# Patient Record
Sex: FEMALE | Race: BLACK OR AFRICAN AMERICAN | NOT HISPANIC OR LATINO | URBAN - METROPOLITAN AREA
[De-identification: names, ages, dates, MRNs, and addresses within clinical notes are randomized per-mention and may not be internally consistent; named-entity substitution may affect disease eponyms.]

---

## 2022-10-06 ENCOUNTER — EMERGENCY (EMERGENCY)
Facility: HOSPITAL | Age: 32
LOS: 1 days | Discharge: STILL ADMITTED OR ADMINISTRATIVE | End: 2022-10-06
Admitting: EMERGENCY MEDICINE

## 2022-10-06 ENCOUNTER — OUTPATIENT (OUTPATIENT)
Dept: INPATIENT UNIT | Facility: HOSPITAL | Age: 32
LOS: 1 days | Discharge: HOME | End: 2022-10-06

## 2022-10-06 VITALS — DIASTOLIC BLOOD PRESSURE: 65 MMHG | HEART RATE: 87 BPM | SYSTOLIC BLOOD PRESSURE: 114 MMHG

## 2022-10-06 VITALS
DIASTOLIC BLOOD PRESSURE: 75 MMHG | RESPIRATION RATE: 18 BRPM | TEMPERATURE: 98 F | HEART RATE: 103 BPM | OXYGEN SATURATION: 100 % | SYSTOLIC BLOOD PRESSURE: 135 MMHG

## 2022-10-06 VITALS
HEART RATE: 99 BPM | SYSTOLIC BLOOD PRESSURE: 129 MMHG | TEMPERATURE: 100 F | RESPIRATION RATE: 16 BRPM | DIASTOLIC BLOOD PRESSURE: 78 MMHG

## 2022-10-06 DIAGNOSIS — O26.899 OTHER SPECIFIED PREGNANCY RELATED CONDITIONS, UNSPECIFIED TRIMESTER: ICD-10-CM

## 2022-10-06 DIAGNOSIS — Z3A.00 WEEKS OF GESTATION OF PREGNANCY NOT SPECIFIED: ICD-10-CM

## 2022-10-06 DIAGNOSIS — Z98.890 OTHER SPECIFIED POSTPROCEDURAL STATES: Chronic | ICD-10-CM

## 2022-10-06 LAB
ALBUMIN SERPL ELPH-MCNC: 3.7 G/DL — SIGNIFICANT CHANGE UP (ref 3.3–5)
ALP SERPL-CCNC: 55 U/L — SIGNIFICANT CHANGE UP (ref 40–120)
ALT FLD-CCNC: 24 U/L — SIGNIFICANT CHANGE UP (ref 4–33)
ANION GAP SERPL CALC-SCNC: 11 MMOL/L — SIGNIFICANT CHANGE UP (ref 7–14)
APPEARANCE UR: CLEAR — SIGNIFICANT CHANGE UP
APTT BLD: 34.4 SEC — SIGNIFICANT CHANGE UP (ref 27–36.3)
AST SERPL-CCNC: 30 U/L — SIGNIFICANT CHANGE UP (ref 4–32)
BASOPHILS # BLD AUTO: 0.02 K/UL — SIGNIFICANT CHANGE UP (ref 0–0.2)
BASOPHILS NFR BLD AUTO: 0.2 % — SIGNIFICANT CHANGE UP (ref 0–2)
BILIRUB SERPL-MCNC: 0.2 MG/DL — SIGNIFICANT CHANGE UP (ref 0.2–1.2)
BILIRUB UR-MCNC: NEGATIVE — SIGNIFICANT CHANGE UP
BUN SERPL-MCNC: 5 MG/DL — LOW (ref 7–23)
CALCIUM SERPL-MCNC: 9.5 MG/DL — SIGNIFICANT CHANGE UP (ref 8.4–10.5)
CHLORIDE SERPL-SCNC: 104 MMOL/L — SIGNIFICANT CHANGE UP (ref 98–107)
CO2 SERPL-SCNC: 21 MMOL/L — LOW (ref 22–31)
COLOR SPEC: YELLOW — SIGNIFICANT CHANGE UP
CREAT ?TM UR-MCNC: 152 MG/DL — SIGNIFICANT CHANGE UP
CREAT SERPL-MCNC: 0.58 MG/DL — SIGNIFICANT CHANGE UP (ref 0.5–1.3)
DIFF PNL FLD: NEGATIVE — SIGNIFICANT CHANGE UP
EGFR: 122 ML/MIN/1.73M2 — SIGNIFICANT CHANGE UP
EOSINOPHIL # BLD AUTO: 0.11 K/UL — SIGNIFICANT CHANGE UP (ref 0–0.5)
EOSINOPHIL NFR BLD AUTO: 0.9 % — SIGNIFICANT CHANGE UP (ref 0–6)
FIBRINOGEN PPP-MCNC: 711 MG/DL — HIGH (ref 330–520)
GLUCOSE SERPL-MCNC: 122 MG/DL — HIGH (ref 70–99)
GLUCOSE UR QL: NEGATIVE — SIGNIFICANT CHANGE UP
HCT VFR BLD CALC: 37.4 % — SIGNIFICANT CHANGE UP (ref 34.5–45)
HGB BLD-MCNC: 12.4 G/DL — SIGNIFICANT CHANGE UP (ref 11.5–15.5)
IANC: 9.4 K/UL — HIGH (ref 1.8–7.4)
IMM GRANULOCYTES NFR BLD AUTO: 0.8 % — SIGNIFICANT CHANGE UP (ref 0–0.9)
INR BLD: 1.06 RATIO — SIGNIFICANT CHANGE UP (ref 0.88–1.16)
KETONES UR-MCNC: ABNORMAL
LDH SERPL L TO P-CCNC: 183 U/L — SIGNIFICANT CHANGE UP (ref 135–225)
LEUKOCYTE ESTERASE UR-ACNC: NEGATIVE — SIGNIFICANT CHANGE UP
LYMPHOCYTES # BLD AUTO: 1.97 K/UL — SIGNIFICANT CHANGE UP (ref 1–3.3)
LYMPHOCYTES # BLD AUTO: 15.9 % — SIGNIFICANT CHANGE UP (ref 13–44)
MCHC RBC-ENTMCNC: 25.3 PG — LOW (ref 27–34)
MCHC RBC-ENTMCNC: 33.2 GM/DL — SIGNIFICANT CHANGE UP (ref 32–36)
MCV RBC AUTO: 76.2 FL — LOW (ref 80–100)
MONOCYTES # BLD AUTO: 0.78 K/UL — SIGNIFICANT CHANGE UP (ref 0–0.9)
MONOCYTES NFR BLD AUTO: 6.3 % — SIGNIFICANT CHANGE UP (ref 2–14)
NEUTROPHILS # BLD AUTO: 9.4 K/UL — HIGH (ref 1.8–7.4)
NEUTROPHILS NFR BLD AUTO: 75.9 % — SIGNIFICANT CHANGE UP (ref 43–77)
NITRITE UR-MCNC: NEGATIVE — SIGNIFICANT CHANGE UP
NRBC # BLD: 0 /100 WBCS — SIGNIFICANT CHANGE UP (ref 0–0)
NRBC # FLD: 0 K/UL — SIGNIFICANT CHANGE UP (ref 0–0)
PH UR: 6 — SIGNIFICANT CHANGE UP (ref 5–8)
PLATELET # BLD AUTO: 241 K/UL — SIGNIFICANT CHANGE UP (ref 150–400)
POTASSIUM SERPL-MCNC: 3.5 MMOL/L — SIGNIFICANT CHANGE UP (ref 3.5–5.3)
POTASSIUM SERPL-SCNC: 3.5 MMOL/L — SIGNIFICANT CHANGE UP (ref 3.5–5.3)
PROT ?TM UR-MCNC: 21 MG/DL — SIGNIFICANT CHANGE UP
PROT ?TM UR-MCNC: 21 MG/DL — SIGNIFICANT CHANGE UP
PROT SERPL-MCNC: 6.9 G/DL — SIGNIFICANT CHANGE UP (ref 6–8.3)
PROT UR-MCNC: ABNORMAL
PROT/CREAT UR-RTO: 0.1 RATIO — SIGNIFICANT CHANGE UP (ref 0–0.2)
PROTHROM AB SERPL-ACNC: 12.3 SEC — SIGNIFICANT CHANGE UP (ref 10.5–13.4)
RBC # BLD: 4.91 M/UL — SIGNIFICANT CHANGE UP (ref 3.8–5.2)
RBC # FLD: 17.1 % — HIGH (ref 10.3–14.5)
SODIUM SERPL-SCNC: 136 MMOL/L — SIGNIFICANT CHANGE UP (ref 135–145)
SP GR SPEC: 1.02 — SIGNIFICANT CHANGE UP (ref 1.01–1.05)
URATE SERPL-MCNC: 4.9 MG/DL — SIGNIFICANT CHANGE UP (ref 2.5–7)
UROBILINOGEN FLD QL: SIGNIFICANT CHANGE UP
WBC # BLD: 12.38 K/UL — HIGH (ref 3.8–10.5)
WBC # FLD AUTO: 12.38 K/UL — HIGH (ref 3.8–10.5)

## 2022-10-06 PROCEDURE — 99213 OFFICE O/P EST LOW 20 MIN: CPT | Mod: 25

## 2022-10-06 PROCEDURE — L9996: CPT

## 2022-10-06 PROCEDURE — 76815 OB US LIMITED FETUS(S): CPT | Mod: 26

## 2022-10-06 PROCEDURE — 93010 ELECTROCARDIOGRAM REPORT: CPT

## 2022-10-06 RX ORDER — LABETALOL HCL 100 MG
200 TABLET ORAL ONCE
Refills: 0 | Status: COMPLETED | OUTPATIENT
Start: 2022-10-06 | End: 2022-10-06

## 2022-10-06 RX ADMIN — Medication 200 MILLIGRAM(S): at 14:24

## 2022-10-06 NOTE — OB PROVIDER TRIAGE NOTE - NSOBPROVIDERNOTE_OBGYN_ALL_OB_FT
[facility]  Labetalol 200mg given for pt's scheduled dose @ 14:00 HELLP labs  Labetalol 200mg given for pt's scheduled dose @ 14:00  EKG performed HELLP labs  Labetalol 200mg given for pt's scheduled dose @ 14:00  EKG performed    no evidence of PEC  BP's normotensive here in triage  pt asymptomatic  d/w MD Castañeda 3rd year OB resident and MD Greco OB service attending  pt to be discharged home with  OB labor instructions  pt to continue monitoring BP's at home - BP parameters given. Instructed to bring BP cuff and BP log with her to next OB visit  pt to return to nearest ER or L&D triage for HA that does not go away w/ Tylenol, nausea or vomiting, blurry vision, epigastric pain, or BP >160/110  Continue taking Labetalol 200mg TID

## 2022-10-06 NOTE — OB PROVIDER TRIAGE NOTE - HISTORY OF PRESENT ILLNESS
33 y/o  at 22 weeks gestation c/o elevated BP's at home of 150/90, repeat 133/101, and dizziness upon standing at home. Pt states occasional HA, relieved by Tylenol. Elevated BP's began in July, 24hr urine mid August revealed 300 protein. Saw MD Jimmie Rossi in [place] on , where carotid US, echo, holter monitor, and EKG performed - all reported WNL. Pt asymptomatic - denies current HA, dizziness, blurry vision, n/v/d, chest pain, SOB, epigastric pain, ctx, cramping, lof, vb.    PNC at [facility], however also seeing Minerva Becker in [place] from [facility] for a "second opinion"    AP course c/b cHTN, resolved subchorionic hematoma  NKDA  Current medications:  -labetalol 200mg TID  -ASA  -PNV  -Tylenol PRN  OBGYN history:  -uterine fibroids  -h/o abnl pap smear, HPV  Denies medical history  Surgical history:  -myomectomy 2019  Denies psych history  Denies smoking, alcohol, and illicit drug use

## 2022-10-06 NOTE — OB PROVIDER TRIAGE NOTE - NSHPPHYSICALEXAM_GEN_ALL_CORE
A&O x3, in no acute distress  lungs: clear bilaterally  heart: regular rate and rhythm  +2 DTR's  no edema present bilaterally  TAS:    Vital Signs Last 24 Hrs  T(C): 37.5 (06 Oct 2022 13:26), Max: 37.5 (06 Oct 2022 13:07)  T(F): 99.5 (06 Oct 2022 13:26), Max: 99.5 (06 Oct 2022 13:07)  HR: 90 (06 Oct 2022 14:16) (90 - 103)  BP: 115/71 (06 Oct 2022 14:16) (115/71 - 135/75)  BP(mean): --  RR: 16 (06 Oct 2022 13:07) (16 - 18)  SpO2: 100% (06 Oct 2022 12:26) (100% - 100%)

## 2022-10-06 NOTE — OB RN TRIAGE NOTE - NS_GESTAGE_OBGYN_ALL_OB_FT
22w Drysol Counseling:  I discussed with the patient the risks of drysol/aluminum chloride including but not limited to skin rash, itching, irritation, burning.

## 2022-10-06 NOTE — ED ADULT TRIAGE NOTE - NS ED NURSE NOTE DISPO AOU
Call to [address] [facility] and advise per NP that patient will need loading dose of herceptin.
Last Herceptin infusion was 9/2015, so patient will need loading dose again as ordered. Verified with pharmacist, Jordan Holden.
Pharmacy called to clarify start date of herceptin. RN advised patient elected to start 1/9/17 post radiation. Pharmacist requested clarification if patient should receive herceptin loading dose. Advised would forward to provider for clarification and communicate. Fax:  [phone].
AOU-L&D

## 2022-10-06 NOTE — OB RN TRIAGE NOTE - FALL HARM RISK - UNIVERSAL INTERVENTIONS
Bed in lowest position, wheels locked, appropriate side rails in place/Instruct patient to call for assistance before getting out of bed or chair/Non-slip footwear when patient is out of bed/[place] to call system/Physically safe environment - no spills, clutter or unnecessary equipment/Purposeful Proactive Rounding/Room/bathroom lighting operational, light cord in reach

## 2022-10-06 NOTE — ED ADULT TRIAGE NOTE - CHIEF COMPLAINT QUOTE
Pt presents to ED ambulatory from home with c/o HTN. Pt reports she is 22weeks pregnant, G1, OBGYN Dr. Cook and Dr. Light, COLIN 02/09/2023. Pt reports blood pressure at home as high as 150/91 and 133/101. Pt takes Labetalol 200mg TID. L&D Ree advised pt to be evaluated with L&D.

## 2022-10-06 NOTE — OB PROVIDER TRIAGE NOTE - PLAN OF CARE
HELLP labs  Labetalol 200mg given for pt's scheduled dose @ 14:00  EKG performed    no evidence of PEC  BP's normotensive here in triage  pt asymptomatic  d/w MD Castañeda 3rd year OB resident and MD Greco OB service attending  pt to be discharged home with  OB labor instructions  pt to continue monitoring BP's at home - BP parameters given. Instructed to bring BP cuff and BP log with her to next OB visit  pt to return to nearest ER or L&D triage for HA that does not go away w/ Tylenol, nausea or vomiting, blurry vision, epigastric pain, or BP >160/110  Continue taking Labetalol 200mg TID

## 2022-10-12 ENCOUNTER — OUTPATIENT (OUTPATIENT)
Dept: INPATIENT UNIT | Facility: HOSPITAL | Age: 32
LOS: 1 days | Discharge: HOME | End: 2022-10-12

## 2022-10-12 ENCOUNTER — EMERGENCY (EMERGENCY)
Facility: HOSPITAL | Age: 32
LOS: 1 days | Discharge: STILL ADMITTED OR ADMINISTRATIVE | End: 2022-10-12
Admitting: EMERGENCY MEDICINE

## 2022-10-12 VITALS
OXYGEN SATURATION: 100 % | TEMPERATURE: 98 F | SYSTOLIC BLOOD PRESSURE: 143 MMHG | DIASTOLIC BLOOD PRESSURE: 87 MMHG | HEART RATE: 95 BPM | RESPIRATION RATE: 18 BRPM

## 2022-10-12 VITALS
SYSTOLIC BLOOD PRESSURE: 127 MMHG | DIASTOLIC BLOOD PRESSURE: 76 MMHG | TEMPERATURE: 98 F | HEART RATE: 92 BPM | RESPIRATION RATE: 17 BRPM

## 2022-10-12 DIAGNOSIS — O26.899 OTHER SPECIFIED PREGNANCY RELATED CONDITIONS, UNSPECIFIED TRIMESTER: ICD-10-CM

## 2022-10-12 DIAGNOSIS — Z98.890 OTHER SPECIFIED POSTPROCEDURAL STATES: Chronic | ICD-10-CM

## 2022-10-12 DIAGNOSIS — Z3A.00 WEEKS OF GESTATION OF PREGNANCY NOT SPECIFIED: ICD-10-CM

## 2022-10-12 PROCEDURE — L9996: CPT

## 2022-10-12 NOTE — OB RN TRIAGE NOTE - CHIEF COMPLAINT QUOTE
"Spotting and high blood pressures, 152/94 " "Spotting and high blood pressures, 152/94, I had my anatomy scan and vaginal sono done on 10/10"

## 2022-10-12 NOTE — ED ADULT TRIAGE NOTE - NS ED TRIAGE AVPU SCALE
Alert-The patient is alert, awake and responds to voice. The patient is oriented to time, place, and person. The triage nurse is able to obtain subjective information.
not examined

## 2022-10-12 NOTE — OB RN TRIAGE NOTE - NS_OBGYNHISTORY_OBGYN_ALL_OB_FT
myomectomy 2019 myomectomy 2019 for one large and few small fibroids  Fibroids X2 small in pregnancy  Abnormal pap smear 09/2022

## 2022-10-12 NOTE — OB RN TRIAGE NOTE - FALL HARM RISK - UNIVERSAL INTERVENTIONS
Bed in lowest position, wheels locked, appropriate side rails in place/Call bell, personal items and telephone in reach/Instruct patient to call for assistance before getting out of bed or chair/Non-slip footwear when patient is out of bed/[place] to call system/Physically safe environment - no spills, clutter or unnecessary equipment/Purposeful Proactive Rounding/Room/bathroom lighting operational, light cord in reach

## 2022-10-12 NOTE — OB RN TRIAGE NOTE - NSICDXPASTMEDICALHX_GEN_ALL_CORE_FT
PAST MEDICAL HISTORY:  Chronic hypertension     No pertinent past medical history      PAST MEDICAL HISTORY:  Chronic hypertension Diagnosedin first trimster of pregnancy    History of abnormal Pap smear

## 2022-10-12 NOTE — ED ADULT TRIAGE NOTE - CHIEF COMPLAINT QUOTE
Pt states she is approximately 5 months pregnant, c/o vaginal bleeding started this evening. Denies abdominal pain. COLIN 2/9/23. L&D notified.

## 2022-10-12 NOTE — OB RN TRIAGE NOTE - CURRENT PREGNANCY COMPLICATIONS, OB PROFILE
headaches/Hypertensive Disorder headaches, S/P Rx for UTI in 09/22, CHTN/Maternal Medical Condition/Hypertensive Disorder/Other

## 2022-10-13 VITALS — SYSTOLIC BLOOD PRESSURE: 132 MMHG | HEART RATE: 90 BPM | DIASTOLIC BLOOD PRESSURE: 78 MMHG

## 2022-10-13 PROBLEM — Z87.898 PERSONAL HISTORY OF OTHER SPECIFIED CONDITIONS: Chronic | Status: ACTIVE | Noted: 2022-10-06

## 2022-10-13 LAB
APPEARANCE UR: CLEAR — SIGNIFICANT CHANGE UP
APTT BLD: 34.2 SEC — SIGNIFICANT CHANGE UP (ref 27–36.3)
BACTERIA # UR AUTO: ABNORMAL
BASOPHILS # BLD AUTO: 0.03 K/UL — SIGNIFICANT CHANGE UP (ref 0–0.2)
BASOPHILS NFR BLD AUTO: 0.2 % — SIGNIFICANT CHANGE UP (ref 0–2)
BILIRUB UR-MCNC: NEGATIVE — SIGNIFICANT CHANGE UP
BLD GP AB SCN SERPL QL: NEGATIVE — SIGNIFICANT CHANGE UP
COD CRY URNS QL: ABNORMAL
COLOR SPEC: SIGNIFICANT CHANGE UP
DIFF PNL FLD: ABNORMAL
EOSINOPHIL # BLD AUTO: 0.13 K/UL — SIGNIFICANT CHANGE UP (ref 0–0.5)
EOSINOPHIL NFR BLD AUTO: 1.1 % — SIGNIFICANT CHANGE UP (ref 0–6)
EPI CELLS # UR: 2 /HPF — SIGNIFICANT CHANGE UP (ref 0–5)
FIBRINOGEN PPP-MCNC: 672 MG/DL — HIGH (ref 330–520)
GLUCOSE UR QL: NEGATIVE — SIGNIFICANT CHANGE UP
HCT VFR BLD CALC: 37.2 % — SIGNIFICANT CHANGE UP (ref 34.5–45)
HGB BLD-MCNC: 12.3 G/DL — SIGNIFICANT CHANGE UP (ref 11.5–15.5)
HYALINE CASTS # UR AUTO: 1 /LPF — SIGNIFICANT CHANGE UP (ref 0–7)
IANC: 8.89 K/UL — HIGH (ref 1.8–7.4)
IMM GRANULOCYTES NFR BLD AUTO: 0.5 % — SIGNIFICANT CHANGE UP (ref 0–0.9)
INR BLD: 1.04 RATIO — SIGNIFICANT CHANGE UP (ref 0.88–1.16)
KETONES UR-MCNC: NEGATIVE — SIGNIFICANT CHANGE UP
LEUKOCYTE ESTERASE UR-ACNC: NEGATIVE — SIGNIFICANT CHANGE UP
LYMPHOCYTES # BLD AUTO: 17.3 % — SIGNIFICANT CHANGE UP (ref 13–44)
LYMPHOCYTES # BLD AUTO: 2.11 K/UL — SIGNIFICANT CHANGE UP (ref 1–3.3)
MCHC RBC-ENTMCNC: 24.9 PG — LOW (ref 27–34)
MCHC RBC-ENTMCNC: 33.1 GM/DL — SIGNIFICANT CHANGE UP (ref 32–36)
MCV RBC AUTO: 75.5 FL — LOW (ref 80–100)
MONOCYTES # BLD AUTO: 1.01 K/UL — HIGH (ref 0–0.9)
MONOCYTES NFR BLD AUTO: 8.3 % — SIGNIFICANT CHANGE UP (ref 2–14)
NEUTROPHILS # BLD AUTO: 8.89 K/UL — HIGH (ref 1.8–7.4)
NEUTROPHILS NFR BLD AUTO: 72.6 % — SIGNIFICANT CHANGE UP (ref 43–77)
NITRITE UR-MCNC: NEGATIVE — SIGNIFICANT CHANGE UP
NRBC # BLD: 0 /100 WBCS — SIGNIFICANT CHANGE UP (ref 0–0)
NRBC # FLD: 0 K/UL — SIGNIFICANT CHANGE UP (ref 0–0)
PH UR: 6.5 — SIGNIFICANT CHANGE UP (ref 5–8)
PLATELET # BLD AUTO: 233 K/UL — SIGNIFICANT CHANGE UP (ref 150–400)
PROT UR-MCNC: ABNORMAL
PROTHROM AB SERPL-ACNC: 12.1 SEC — SIGNIFICANT CHANGE UP (ref 10.5–13.4)
RBC # BLD: 4.93 M/UL — SIGNIFICANT CHANGE UP (ref 3.8–5.2)
RBC # FLD: 16.4 % — HIGH (ref 10.3–14.5)
RBC CASTS # UR COMP ASSIST: 8 /HPF — HIGH (ref 0–4)
RH IG SCN BLD-IMP: POSITIVE — SIGNIFICANT CHANGE UP
SP GR SPEC: 1.01 — SIGNIFICANT CHANGE UP (ref 1.01–1.05)
UROBILINOGEN FLD QL: SIGNIFICANT CHANGE UP
WBC # BLD: 12.23 K/UL — HIGH (ref 3.8–10.5)
WBC # FLD AUTO: 12.23 K/UL — HIGH (ref 3.8–10.5)
WBC UR QL: 6 /HPF — HIGH (ref 0–5)

## 2022-10-13 PROCEDURE — 76830 TRANSVAGINAL US NON-OB: CPT | Mod: 26

## 2022-10-13 PROCEDURE — 99213 OFFICE O/P EST LOW 20 MIN: CPT | Mod: 25

## 2022-10-13 PROCEDURE — 76819 FETAL BIOPHYS PROFIL W/O NST: CPT | Mod: 26

## 2022-10-13 RX ORDER — ACETAMINOPHEN 500 MG
2 TABLET ORAL
Qty: 0 | Refills: 0 | DISCHARGE

## 2022-10-13 RX ORDER — PROPRANOLOL HCL 160 MG
0 CAPSULE, EXTENDED RELEASE 24HR ORAL
Qty: 0 | Refills: 0 | DISCHARGE

## 2022-10-13 RX ORDER — LABETALOL HCL 100 MG
0 TABLET ORAL
Qty: 0 | Refills: 0 | DISCHARGE

## 2022-10-13 RX ORDER — ASPIRIN/CALCIUM CARB/MAGNESIUM 324 MG
1 TABLET ORAL
Qty: 0 | Refills: 0 | DISCHARGE

## 2022-10-13 NOTE — OB PROVIDER TRIAGE NOTE - NSICDXPASTMEDICALHX_GEN_ALL_CORE_FT
PAST MEDICAL HISTORY:  Chronic hypertension Diagnosedin first trimster of pregnancy    History of abnormal Pap smear

## 2022-10-13 NOTE — OB PROVIDER TRIAGE NOTE - NSOBPROVIDERNOTE_OBGYN_ALL_OB_FT
32 yo , EGA@38 5/7 weeks vaginal spotting. 30 yo , EGA@38 5/7 weeks vaginal spotting.  no S/S of threaten  at this time   0440 plan discuss with DR. Humphrey PGY-3 and Dr Cara matute for discharge  Pt to keep self very well hydrated.   Discharge patient home.  Pre-term Labor precautions if not in labor, will follow up with OB for the next schedule appointment.  Prior notes, lab results (prenatal chart review, prenatal labs) and recommendations were reviewed;  All ordered tests results reviewed and interpreted.  Plan of care was reviewed with patient and family; patient states understanding of the above plan.  In total 35 minutes spent with established/new patient.

## 2022-10-13 NOTE — OB PROVIDER TRIAGE NOTE - NSHPPHYSICALEXAM_GEN_ALL_CORE
Vital Signs Last 24 Hrs  T(C): 36.8 (12 Oct 2022 23:04), Max: 36.9 (12 Oct 2022 22:39)  T(F): 98.2 (12 Oct 2022 23:04), Max: 98.4 (12 Oct 2022 22:39)  HR: 86 (13 Oct 2022 02:00) (75 - 95)  BP: 114/55 (13 Oct 2022 02:00) (114/55 - 143/87)  RR: 17 (12 Oct 2022 23:04) (17 - 18)  SpO2: 100% (12 Oct 2022 22:39) (100% - 100%)    Gen: NAD  Head: NC/AT  Cardio: S1S2+, RRR  Resp: CTABL, no wheezing  Abdomen: Soft, NT/ND, BS+  Extremities: No LE edema bilaterally      [place]: no Contractions noted    plan  UA  CBC, Fibrinogen  type and screen Vital Signs Last 24 Hrs  T(C): 36.8 (12 Oct 2022 23:04), Max: 36.9 (12 Oct 2022 22:39)  T(F): 98.2 (12 Oct 2022 23:04), Max: 98.4 (12 Oct 2022 22:39)  HR: 86 (13 Oct 2022 02:00) (75 - 95)  BP: 114/55 (13 Oct 2022 02:00) (114/55 - 143/87)  RR: 17 (12 Oct 2022 23:04) (17 - 18)  SpO2: 100% (12 Oct 2022 22:39) (100% - 100%)    Gen: NAD  Head: NC/AT  Cardio: S1S2+, RRR  Resp: CTABL, no wheezing  Abdomen: Soft, NT/ND, BS+  Extremities: No LE edema bilaterally      [place]: no Contractions noted    plan  UA  CBC, Fibrinogen, Pt, PTT INR  type and screen Vital Signs Last 24 Hrs  T(C): 36.8 (12 Oct 2022 23:04), Max: 36.9 (12 Oct 2022 22:39)  T(F): 98.2 (12 Oct 2022 23:04), Max: 98.4 (12 Oct 2022 22:39)  HR: 86 (13 Oct 2022 02:00) (75 - 95)  BP: 114/55 (13 Oct 2022 02:00) (114/55 - 143/87)  RR: 17 (12 Oct 2022 23:04) (17 - 18)  SpO2: 100% (12 Oct 2022 22:39) (100% - 100%)    Gen: NAD  Head: NC/AT  Cardio: S1S2+, RRR  Resp: CTABL, no wheezing  Abdomen: Soft, NT/ND, BS+  Extremities: No LE edema bilaterally    [place]: no Contractions noted  TAUS: cephalic presentation, posterior placenta, MVP 7.33  BPM  SSE: scant amount of leukorrhea, cervix appear close. no bleeding noted, FFN collected and held  TVUS: cervical length 4.14-4.22, no dynamical changes noted.    plan  UA  CBC, Fibrinogen, Pt, PTT INR  type and screen

## 2022-10-13 NOTE — OB PROVIDER TRIAGE NOTE - NS_OBGYNHISTORY_OBGYN_ALL_OB_FT
myomectomy 2019 for one large and few small fibroids  Fibroids X2 small in pregnancy  Abnormal pap smear 09/2022

## 2022-10-13 NOTE — OB PROVIDER TRIAGE NOTE - NSHPLABSRESULTS_GEN_ALL_CORE
CBC Full  -  ( 13 Oct 2022 02:30 )  WBC Count : 12.23 K/uL  RBC Count : 4.93 M/uL  Hemoglobin : 12.3 g/dL  Hematocrit : 37.2 %  Platelet Count - Automated : 233 K/uL  Mean Cell Volume : 75.5 fL  Mean Cell Hemoglobin : 24.9 pg  Mean Cell Hemoglobin Concentration : 33.1 gm/dL  Auto Neutrophil # : 8.89 K/uL  Auto Lymphocyte # : 2.11 K/uL  Auto Monocyte # : 1.01 K/uL  Auto Eosinophil # : 0.13 K/uL  Auto Basophil # : 0.03 K/uL  Auto Neutrophil % : 72.6 %  Auto Lymphocyte % : 17.3 %  Auto Monocyte % : 8.3 %  Auto Eosinophil % : 1.1 %  Auto Basophil % : 0.2 %    PT/INR - ( 13 Oct 2022 02:30 )   PT: 12.1 sec;   INR: 1.04 ratio         PTT - ( 13 Oct 2022 02:30 )  PTT:34.2 sec  Fibrinogen 672  Urinalysis Basic - ( 13 Oct 2022 02:30 )    Color: Light Yellow / Appearance: Clear / S.015 / pH: x  Gluc: x / Ketone: Negative  / Bili: Negative / Urobili: <2 mg/dL   Blood: x / Protein: Trace / Nitrite: Negative   Leuk Esterase: Negative / RBC: 8 /HPF / WBC 6 /HPF   Sq Epi: x / Non Sq Epi: 2 /HPF / Bacteria: Moderate CBC Full  -  ( 13 Oct 2022 02:30 )  WBC Count : 12.23 K/uL  RBC Count : 4.93 M/uL  Hemoglobin : 12.3 g/dL  Hematocrit : 37.2 %  Platelet Count - Automated : 233 K/uL  Mean Cell Volume : 75.5 fL  Mean Cell Hemoglobin : 24.9 pg  Mean Cell Hemoglobin Concentration : 33.1 gm/dL  Auto Neutrophil # : 8.89 K/uL  Auto Lymphocyte # : 2.11 K/uL  Auto Monocyte # : 1.01 K/uL  Auto Eosinophil # : 0.13 K/uL  Auto Basophil # : 0.03 K/uL  Auto Neutrophil % : 72.6 %  Auto Lymphocyte % : 17.3 %  Auto Monocyte % : 8.3 %  Auto Eosinophil % : 1.1 %  Auto Basophil % : 0.2 %    PT/INR - ( 13 Oct 2022 02:30 )   PT: 12.1 sec;   INR: 1.04 ratio         PTT - ( 13 Oct 2022 02:30 )  PTT:34.2 sec  Fibrinogen 672  Urinalysis Basic - ( 13 Oct 2022 02:30 )    Color: Light Yellow / Appearance: Clear / S.015 / pH: x  Gluc: x / Ketone: Negative  / Bili: Negative / Urobili: <2 mg/dL   Blood: x / Protein: Trace / Nitrite: Negative   Leuk Esterase: Negative / RBC: 8 /HPF / WBC 6 /HPF   Sq Epi: x / Non Sq Epi: 2 /HPF / Bacteria: Moderate  blood type O positive

## 2022-12-06 NOTE — OB PROVIDER TRIAGE NOTE - HISTORY OF PRESENT ILLNESS
33 y/o  at 22 6/7 weeks gestation c/o vaginal spotting 0930 when wipping,  denies vaginal leakage of fluid, report positive fetal movement.  denies constipation or urinary issues.   Pt asymptomatic - denies current HA, dizziness, blurry vision, n/v/d, chest pain, SOB, epigastric pain,    PNC at [facility], however also seeing Minerva Becker in [place] from [facility] for a "second opinion"    AP course c/b cHTN, Elevated BP's began in July, 24hr urine mid August revealed 300 protein. Saw MD Jimmie Rossi in [place] on , where carotid US, echo, holter monitor, and EKG performed -   resolved subchorionic hematoma  NKDA  Current medications:  -labetalol 200mg TID last dose 10 PM  -ASA 1 tab daily  -PNV  -Tylenol PRN for HA.   OBGYN history:  -uterine fibroids  -h/o abnl pap smear 2022, HPV follow up after delivery  Denies medical history  Surgical history:  -myomectomy 2019  Denies psych history  Denies smoking, alcohol, and illicit drug use 31 y/o  at 22 6/7 weeks gestation c/o vaginal spotting 0930 when wiping bright red then brown,  denies vaginal leakage of fluid, report positive fetal movement.  denies constipation or urinary issues.   Pt asymptomatic - denies current HA, dizziness, blurry vision, n/v/d, chest pain, SOB, epigastric pain,    PNC at [facility], however also seeing Minerva Becker in [place] from [facility] for a "second opinion"    AP course complicated with cHTN, Elevated BP's began in July, 24hr urine mid August revealed 300 protein. Saw MD Jimmie Rossi in [place] on , where carotid US, echo, holter monitor, and EKG performed -   -resolved subchorionic hematoma  NKDA  Current medications:  -labetalol 200mg TID last dose 10 PM  -ASA 1 tab daily  -PNV  -Tylenol PRN for HA.   OBGYN history:  -uterine fibroids  -h/o abnl pap smear 2022, HPV follow up after delivery  Denies medical history  Surgical history:  -myomectomy 2019  Denies psych history  Denies smoking, alcohol, and illicit drug use Opt out